# Patient Record
(demographics unavailable — no encounter records)

---

## 2025-03-18 NOTE — PHYSICAL EXAM
[General Appearance - Alert] : alert [General Appearance - In No Acute Distress] : in no acute distress [General Appearance - Well Nourished] : well nourished [General Appearance - Well Developed] : well developed [Person] : oriented to person [Place] : oriented to place [Time] : oriented to time [Remote Intact] : remote memory intact [Registration Intact] : recent registration memory intact [Span Intact] : the attention span was normal [Concentration Intact] : normal concentrating ability [Visual Intact] : visual attention was ~T not ~L decreased [Naming Objects] : no difficulty naming common objects [Repeating Phrases] : no difficulty repeating a phrase [Fluency] : fluency intact [Comprehension] : comprehension intact [Past History] : adequate knowledge of personal past history [Cranial Nerves Optic (II)] : visual acuity intact bilaterally,  visual fields full to confrontation, pupils equal round and reactive to light [Cranial Nerves Oculomotor (III)] : extraocular motion intact [Cranial Nerves Trigeminal (V)] : facial sensation intact symmetrically [Cranial Nerves Facial (VII)] : face symmetrical [Cranial Nerves Vestibulocochlear (VIII)] : hearing was intact bilaterally [Cranial Nerves Glossopharyngeal (IX)] : tongue and palate midline [Cranial Nerves Accessory (XI - Cranial And Spinal)] : head turning and shoulder shrug symmetric [Cranial Nerves Hypoglossal (XII)] : there was no tongue deviation with protrusion [Motor Tone] : muscle tone was normal in all four extremities [Motor Strength] : muscle strength was normal in all four extremities [Involuntary Movements] : no involuntary movements were seen [No Muscle Atrophy] : normal bulk in all four extremities [Paresis Pronator Drift Right-Sided] : no pronator drift on the right [Paresis Pronator Drift Left-Sided] : no pronator drift on the left [Motor Strength Upper Extremities Bilaterally] : strength was normal in both upper extremities [Motor Strength Lower Extremities Bilaterally] : strength was normal in both lower extremities [Sensation Vibration Decrease] : vibration was intact [Proprioception] : proprioception was intact [Tactile Decrease Entire Leg Right] : diminished right leg [Abnormal Walk] : normal gait [Balance] : balance was intact [Tremor] : no tremor present [Coordination - Dysmetria Impaired Finger-to-Nose Bilateral] : not present [2+] : Patella left 2+ [Sclera] : the sclera and conjunctiva were normal [PERRL With Normal Accommodation] : pupils were equal in size, round, reactive to light, with normal accommodation [Extraocular Movements] : extraocular movements were intact [No APD] : no afferent pupillary defect [No FEROZ] : no internuclear ophthalmoplegia [Full Visual Field] : full visual field

## 2025-03-18 NOTE — HISTORY OF PRESENT ILLNESS
[FreeTextEntry1] : Initial office visit March 18, 2025: This is a 67-year-old man who presents today with complaint of right leg numbness.  He says that when he touches it, it does not feel similar to where he touches a different part of his leg.  He states it has been going on for about 1 year.  He states it starts sometimes at the knee and sometimes just below the knee.  It is on the lateral aspect of the leg and wraps around into his foot. He does have a history of sciatica in that leg he states. He states laying down makes it worse.  Activity does not make it worse he states.  He denies back pain currently.  About 6 months ago he tried 3 months of physical therapy which did not help.  He is here today for neurologic evaluation.

## 2025-03-18 NOTE — CONSULT LETTER
[Dear  ___] : Dear  [unfilled], [Consult Letter:] : I had the pleasure of evaluating your patient, [unfilled]. [Please see my note below.] : Please see my note below. [Consult Closing:] : Thank you very much for allowing me to participate in the care of this patient.  If you have any questions, please do not hesitate to contact me. [Sincerely,] : Sincerely, [FreeTextEntry3] : Saeid Ann M.D., Ph.D. DPN-N [facility] [place] Director, Division of Neurology Director, [facility]

## 2025-03-18 NOTE — ASSESSMENT
[FreeTextEntry1] : This is a 67-year-old man with numbness in the peroneal nerve distribution in the right leg.  He does have a history of sciatica, but it is not affecting his back this time.  To differentiate between an L5 radiculopathy and a peroneal neuropathy I would like to do an EMG and nerve conduction study of the right lower extremity.  He does not have evidence for foot drop, at this time his symptoms are only sensory in nature.  I will call him with these results and determine further treatment plan as indicated at that time.